# Patient Record
Sex: MALE | Race: WHITE | NOT HISPANIC OR LATINO | Employment: FULL TIME | URBAN - METROPOLITAN AREA
[De-identification: names, ages, dates, MRNs, and addresses within clinical notes are randomized per-mention and may not be internally consistent; named-entity substitution may affect disease eponyms.]

---

## 2022-01-15 ENCOUNTER — HOSPITAL ENCOUNTER (EMERGENCY)
Facility: CLINIC | Age: 52
Discharge: HOME | End: 2022-01-15
Attending: EMERGENCY MEDICINE | Admitting: EMERGENCY MEDICINE
Payer: COMMERCIAL

## 2022-01-15 VITALS
HEIGHT: 74 IN | DIASTOLIC BLOOD PRESSURE: 114 MMHG | BODY MASS INDEX: 25.67 KG/M2 | SYSTOLIC BLOOD PRESSURE: 183 MMHG | WEIGHT: 200 LBS | RESPIRATION RATE: 18 BRPM | OXYGEN SATURATION: 100 % | TEMPERATURE: 98.1 F | HEART RATE: 60 BPM

## 2022-01-15 DIAGNOSIS — S05.02XA ABRASION OF LEFT CORNEA, INITIAL ENCOUNTER: ICD-10-CM

## 2022-01-15 PROCEDURE — 99283 EMERGENCY DEPT VISIT LOW MDM: CPT

## 2022-01-15 RX ORDER — TETRACAINE HYDROCHLORIDE 5 MG/ML
2 SOLUTION OPHTHALMIC ONCE
Status: DISCONTINUED | OUTPATIENT
Start: 2022-01-15 | End: 2022-01-16 | Stop reason: HOSPADM

## 2022-01-15 RX ORDER — ERYTHROMYCIN 5 MG/G
0.5 OINTMENT OPHTHALMIC 3 TIMES DAILY
Qty: 1 G | Refills: 0 | Status: SHIPPED | OUTPATIENT
Start: 2022-01-15

## 2022-01-15 RX ORDER — POLYMYXIN B SULFATE AND TRIMETHOPRIM 1; 10000 MG/ML; [USP'U]/ML
1-2 SOLUTION OPHTHALMIC EVERY 4 HOURS
Qty: 10 ML | Refills: 0 | Status: SHIPPED | OUTPATIENT
Start: 2022-01-15

## 2022-01-15 ASSESSMENT — VISUAL ACUITY
OS: 20/30
OD: 20/30

## 2022-01-15 ASSESSMENT — MIFFLIN-ST. JEOR: SCORE: 1841.94

## 2022-01-16 NOTE — ED PROVIDER NOTES
"  History     Chief Complaint:  Eye Injury       HPI   Dieter Ruelas is a 51 year old male who does not wear corrective lenses presenting for left eye pain after he was scratched by his dog.  He reports some blurry vision and pain with feeling like something stuck in his eye.    ROS:  Review of Systems  Blurry vision, L eye pain.   No neck pain  No ear pain    Allergies:  None Reported        Medications:    None Reported       Past Medical History:    None Reported     Past Surgical History:    None Reported        Family History:    None Reported       Social History:  Arrives alone  PCP: No primary care provider on file.     Physical Exam   Patient Vitals for the past 24 hrs:   BP Temp Temp src Pulse Resp SpO2 Height Weight   01/15/22 1937 (!) 183/114 98.1  F (36.7  C) Temporal 60 18 100 % 1.88 m (6' 2\") 90.7 kg (200 lb)        Physical Exam  Constitutional: Alert, attentive, GCS 15   Eyes: Left eye is injected with large superior corneal abrasion covering 50% of the visual axis, no Becca sign, no hyphema, visual acuity 20/30 bilaterally.  Superficial abrasion left upper eyelid  CV: distal extremities warm, well perfused  Chest: Non-labored breathing on RA  GI:  non tender. No distension. No guarding or rebound.    Neurological: Alert, attentive, moving all extremities equally.   Skin: Skin is warm and dry.            Emergency Department Course     Interventions:  Medications   fluorescein (FUL-ARAM) ophthalmic strip 1 strip (has no administration in time range)   tetracaine (PONTOCAINE) 0.5 % ophthalmic solution 2 drop (has no administration in time range)        Disposition:  The patient was discharged to home.     Impression & Plan      Medical Decision Makin-year-old male no past medical history presenting for evaluation of left eye pain after he was scratched by his dog.  Visual acuity is symmetric, ocular exam demonstrates a large corneal abrasion involving nearly 50% of the visual axis.  No " evidence of hyphema, do not suspect traumatic iritis or globe penetration.  We will initiate him on Polytrim for antibiotic prophylaxis as well as erythromycin ointment at night for comfort.  I recommended close ophthalmology follow-up.  I recommended Tylenol, ibuprofen for pain.  Return precautions reviewed and he was discharged home.       Diagnosis:    ICD-10-CM    1. Abrasion of left cornea, initial encounter  S05.02XA         Discharge Medications:  Discharge Medication List as of 1/15/2022 10:09 PM      START taking these medications    Details   erythromycin (ROMYCIN) 5 MG/GM ophthalmic ointment Place 0.5 inches into the right eye 3 times dailyDisp-1 g, R-0Local Print      trimethoprim-polymyxin b (POLYTRIM) 14476-9.1 UNIT/ML-% ophthalmic solution Place 1-2 drops Into the left eye every 4 hours, Disp-10 mL, R-0, Local Print           Jose Alfredo Mcmahon MD  [employer]  12:11 AM 01/16/22        1/15/2022   Jose Alfredo Mcmahon MD Dunbar, John Forrest, MD  01/16/22 0011       Jose Alfredo Mcmahon MD  01/16/22 0012

## 2022-01-16 NOTE — ED TRIAGE NOTES
Got struck in his left eye by his dog's nail about 2 hours ago, pain worsening. Eye is teary but denies any vision changes. Tried some eye drop at 5:30pm and took Advil around 6pm. ABCs intact.

## 2022-01-16 NOTE — DISCHARGE INSTRUCTIONS
You should use the antibiotic eyedrops as prescribed, you can use the erythromycin ointment at night especially to help with comfort.  It is okay to take Tylenol and ibuprofen as well for pain.  You should make an appointment to follow-up with ophthalmology on Monday or Tuesday for recheck.  Should you develop severe pain, changes to your vision you should return to the emergency department.

## 2023-08-31 ENCOUNTER — HOSPITAL ENCOUNTER (EMERGENCY)
Facility: CLINIC | Age: 53
Discharge: HOME | End: 2023-08-31
Attending: EMERGENCY MEDICINE | Admitting: EMERGENCY MEDICINE
Payer: COMMERCIAL

## 2023-08-31 VITALS
TEMPERATURE: 98.5 F | SYSTOLIC BLOOD PRESSURE: 142 MMHG | WEIGHT: 215.39 LBS | HEIGHT: 75 IN | HEART RATE: 78 BPM | BODY MASS INDEX: 26.78 KG/M2 | OXYGEN SATURATION: 96 % | DIASTOLIC BLOOD PRESSURE: 90 MMHG | RESPIRATION RATE: 18 BRPM

## 2023-08-31 DIAGNOSIS — T78.40XA ALLERGIC REACTION, INITIAL ENCOUNTER: ICD-10-CM

## 2023-08-31 LAB
HOLD SPECIMEN: NORMAL

## 2023-08-31 PROCEDURE — 258N000003 HC RX IP 258 OP 636: Performed by: EMERGENCY MEDICINE

## 2023-08-31 PROCEDURE — 250N000009 HC RX 250: Performed by: EMERGENCY MEDICINE

## 2023-08-31 PROCEDURE — 250N000011 HC RX IP 250 OP 636: Performed by: EMERGENCY MEDICINE

## 2023-08-31 PROCEDURE — 99285 EMERGENCY DEPT VISIT HI MDM: CPT | Mod: 25

## 2023-08-31 PROCEDURE — 96375 TX/PRO/DX INJ NEW DRUG ADDON: CPT

## 2023-08-31 PROCEDURE — 96372 THER/PROPH/DIAG INJ SC/IM: CPT | Mod: 59

## 2023-08-31 PROCEDURE — 96374 THER/PROPH/DIAG INJ IV PUSH: CPT

## 2023-08-31 PROCEDURE — 96361 HYDRATE IV INFUSION ADD-ON: CPT

## 2023-08-31 RX ORDER — METHYLPREDNISOLONE SODIUM SUCCINATE 125 MG/2ML
125 INJECTION, POWDER, LYOPHILIZED, FOR SOLUTION INTRAMUSCULAR; INTRAVENOUS ONCE
Status: COMPLETED | OUTPATIENT
Start: 2023-08-31 | End: 2023-08-31

## 2023-08-31 RX ORDER — EPINEPHRINE 0.3 MG/.3ML
0.3 INJECTION SUBCUTANEOUS
Qty: 2 EACH | Refills: 0 | Status: SHIPPED | OUTPATIENT
Start: 2023-08-31

## 2023-08-31 RX ADMIN — METHYLPREDNISOLONE SODIUM SUCCINATE 125 MG: 125 INJECTION, POWDER, FOR SOLUTION INTRAMUSCULAR; INTRAVENOUS at 11:40

## 2023-08-31 RX ADMIN — FAMOTIDINE 20 MG: 10 INJECTION, SOLUTION INTRAVENOUS at 11:39

## 2023-08-31 RX ADMIN — EPINEPHRINE 0.3 MG: 1 INJECTION INTRAMUSCULAR; INTRAVENOUS; SUBCUTANEOUS at 11:40

## 2023-08-31 RX ADMIN — SODIUM CHLORIDE 1000 ML: 9 INJECTION, SOLUTION INTRAVENOUS at 11:39

## 2023-08-31 ASSESSMENT — ACTIVITIES OF DAILY LIVING (ADL)
ADLS_ACUITY_SCORE: 35
ADLS_ACUITY_SCORE: 35

## 2023-08-31 NOTE — ED PROVIDER NOTES
"  History     Chief Complaint:  Allergic Reaction       HPI   Dieter Ruelas is a 53 year old male presenting for an allergic reaction. Patient reports that he woke up feeling fine, ate a breakfast of a muffin and raspberry jam and a protein shake, then went to an eye doctor appointment. Patient states that he \"felt off\" on the way over and continually felt worse. Patient had trouble swallowing and his extremities were itchy and his face and limbs developed a red rash. Patient left the appointment early and took 50 mg of Benadryl. His symptoms improved slightly, and he came to the ED. Patient states he has no allergies that he is aware of. Patient denies shortness of breath. Patient states that he can swallow now but it feels like there is a frog in his throat.    Independent Historian:   Patient's wife is at bedside and corroborates the above history.    Review of External Notes:   None    Medications:    The patient is currently on no regular medications.     Past Medical History:    No pertinent past medical history.    Physical Exam   Patient Vitals for the past 24 hrs:   BP Temp Pulse Resp SpO2 Height Weight   08/31/23 1500 (!) 142/90 -- 78 18 96 % -- --   08/31/23 1426 -- -- -- 17 -- -- --   08/31/23 1416 (!) 149/107 -- 67 -- 95 % -- --   08/31/23 1401 (!) 148/97 -- 69 16 95 % -- --   08/31/23 1350 -- -- 72 -- 96 % -- --   08/31/23 1345 (!) 149/95 -- 69 -- 97 % -- --   08/31/23 1331 (!) 153/98 -- 71 -- 96 % -- --   08/31/23 1312 -- -- 68 18 96 % -- --   08/31/23 1307 -- -- 70 -- 96 % -- --   08/31/23 1302 (!) 150/97 -- 68 -- 97 % -- --   08/31/23 1257 -- -- 66 -- 99 % -- --   08/31/23 1252 -- -- 67 -- 97 % -- --   08/31/23 1247 (!) 157/107 -- 70 -- 97 % -- --   08/31/23 1234 (!) 147/96 -- 71 -- 97 % -- --   08/31/23 1219 (!) 148/96 -- 65 19 98 % -- --   08/31/23 1202 (!) 155/101 -- 60 15 97 % -- --   08/31/23 1156 -- -- -- -- -- 1.892 m (6' 2.5\") --   08/31/23 1155 -- -- 65 17 95 % -- --   08/31/23 1150 -- " -- 75 19 -- -- --   23 1145 (!) 146/104 -- 76 -- 96 % -- --   23 1140 -- -- 76 -- 96 % -- --   23 1135 -- -- 93 -- 95 % -- --   23 1130 (!) 164/111 -- 102 -- 97 % -- --   23 1125 (!) 186/119 98.5  F (36.9  C) 100 20 99 % -- 97.7 kg (215 lb 6.2 oz)        Physical Exam  General: Laying on the ED bed  HEENT: Normocephalic, atraumatic no intraoral swelling  Cardiac: Radial pulses 2+, regular rate and rhythm  Pulm: Breathing comfortably, no accessory muscle usage, no conversational dyspnea, and lungs clear bilaterally  GI: Abdomen soft, nontender, no rigidity or guarding  MSK: No bony deformities  Skin: Warm and dry, diffuse erythema about the face, anterior abdomen, hands, feet  Neuro: Moves all extremities  Psych: Pleasant mood and affect      Emergency Department Course     Emergency Department Course & Assessments:  Interventions:  Medications   0.9% sodium chloride BOLUS (0 mLs Intravenous Stopped 23 1239)   methylPREDNISolone sodium succinate (solu-MEDROL) injection 125 mg (125 mg Intravenous $Given 23 1140)   EPINEPHrine (ADRENALIN) kit 0.3 mg (0.3 mg Subcutaneous $Given 23 1140)      Independent Interpretation (X-rays, CTs, rhythm strip):  None    Assessments/Consultations/Discussion of Management or Tests:   ED Course as of 23 1509   Thu Aug 31, 2023   1140 I examined the patient and obtained history as noted above.       Social Determinants of Health affecting care:   None    Disposition:  The patient was discharged to home.     Impression & Plan      Medical Decision Makin-year-old male presents with allergic response to an unknown allergen.  Vital signs are stable, has some throat itching but otherwise no airway symptoms and no wheeze on exam.  No syncope or near syncope to suggest anaphylactic shock.  Given the extent of the patient's urticaria, he was treated with epinephrine, Pepcid, Solu-Medrol.  After a period of observation in the ED for several  hours, his symptoms improved, he was back at baseline, and had no evidence or symptoms of a biphasic reaction.  Plan is for discharge home with close PCP follow-up for further allergy work-up, prescriptions for EpiPen for any recurrence of symptoms above.    Critical Care time:  was 0 minutes for this patient excluding procedures.    Diagnosis:    ICD-10-CM    1. Allergic reaction, initial encounter  T78.40XA            Discharge Medications:  New Prescriptions    EPINEPHRINE (ANY BX GENERIC EQUIV) 0.3 MG/0.3ML INJECTION 2-PACK    Inject 0.3 mLs (0.3 mg) into the muscle once as needed for anaphylaxis May repeat one time in 5-15 minutes if response to initial dose is inadequate.      Scribe Disclosure:  ARTHUR, Jolie Mckeon, am serving as a scribe at 12:09 PM on 8/31/2023 to document services personally performed by Florentino Rubio MD based on my observations and the provider's statements to me.   8/31/2023   Florentino Rubio MD King, Colin, MD  08/31/23 4502

## 2023-08-31 NOTE — ED TRIAGE NOTES
Patient arrives with complaints of allergic reaction. Pt has diffuse red rash and tongue is swollen. Patient reports feeling throat is tight. Unknown allergy.